# Patient Record
Sex: FEMALE | Employment: UNEMPLOYED | URBAN - METROPOLITAN AREA
[De-identification: names, ages, dates, MRNs, and addresses within clinical notes are randomized per-mention and may not be internally consistent; named-entity substitution may affect disease eponyms.]

---

## 2020-09-13 ENCOUNTER — HOSPITAL ENCOUNTER (EMERGENCY)
Age: 25
Discharge: HOME | End: 2020-09-13

## 2020-09-13 VITALS
OXYGEN SATURATION: 98 % | TEMPERATURE: 100.4 F | WEIGHT: 156 LBS | DIASTOLIC BLOOD PRESSURE: 69 MMHG | RESPIRATION RATE: 15 BRPM | HEART RATE: 108 BPM | SYSTOLIC BLOOD PRESSURE: 136 MMHG

## 2020-09-13 PROCEDURE — 99282 EMERGENCY DEPT VISIT SF MDM: CPT

## 2020-09-13 RX ORDER — AMOXICILLIN AND CLAVULANATE POTASSIUM 875; 125 MG/1; MG/1
1 TABLET, FILM COATED ORAL 2 TIMES DAILY
Qty: 20 TABLET | Refills: 0 | Status: SHIPPED | OUTPATIENT
Start: 2020-09-13 | End: 2020-09-23

## 2020-09-13 RX ORDER — ONDANSETRON 4 MG/1
4 TABLET, ORALLY DISINTEGRATING ORAL EVERY 6 HOURS
Qty: 10 TABLET | Refills: 0 | Status: SHIPPED | OUTPATIENT
Start: 2020-09-13

## 2020-09-13 ASSESSMENT — PAIN SCALES - GENERAL: PAINLEVEL_OUTOF10: 6

## 2020-09-13 ASSESSMENT — PAIN DESCRIPTION - LOCATION: LOCATION: THROAT

## 2020-09-13 NOTE — ED PROVIDER NOTES
EMERGENCY DEPARTMENT ENCOUNTER      PCP: No primary care provider on file. CHIEF COMPLAINT    Chief Complaint   Patient presents with    Emesis    Diarrhea    Fever           This patient was not evaluated by the attending physician. I have independently evaluated this patient. Patient is Paraguayan-speaking only-history, review of systems, plan and disposition today per translation iPad. HPI    José Miguel Wiley is a 30 y.o. female who presents with sore throat, fever, nausea and soft stools. Onset 2-3 days. Context is patient has the above symptoms with no known sick contacts. No known contacts to COVID-19. Patient denies urinary or vaginal symptoms. Denies abdominal discomfort. Denies cough. REVIEW OF SYSTEMS    Constitutional:  Denies fever, chills, weight loss or weakness   HENT: See HPI. Cardiovascular:  Denies chest pain, palpitations   Respiratory:  Denies cough or shortness of breath    GI: See HPI. :  Denies any urinary symptoms or vaginal symptoms. Musculoskeletal:  Denies back pain  Skin:  Denies rash  Neurologic:  Denies headache, focal weakness or sensory changes   Endocrine:  Denies polyuria or polydypsia   Lymphatic:  Denies swollen glands     All other review of systems are negative  See HPI and nursing notes for additional information     PAST MEDICAL AND SURGICAL HISTORY    History reviewed. No pertinent past medical history. History reviewed. No pertinent surgical history.     CURRENT MEDICATIONS    Current Outpatient Rx   Medication Sig Dispense Refill    amoxicillin-clavulanate (AUGMENTIN) 875-125 MG per tablet Take 1 tablet by mouth 2 times daily for 10 days 20 tablet 0    ondansetron (ZOFRAN ODT) 4 MG disintegrating tablet Take 1 tablet by mouth every 6 hours 10 tablet 0       ALLERGIES    No Known Allergies    SOCIAL AND FAMILY HISTORY    Social History     Socioeconomic History    Marital status: Unknown     Spouse name: None    Number of children: None    Years of education: None    Highest education level: None   Occupational History    None   Social Needs    Financial resource strain: None    Food insecurity     Worry: None     Inability: None    Transportation needs     Medical: None     Non-medical: None   Tobacco Use    Smoking status: Never Smoker    Smokeless tobacco: Never Used   Substance and Sexual Activity    Alcohol use: No    Drug use: No    Sexual activity: None   Lifestyle    Physical activity     Days per week: None     Minutes per session: None    Stress: None   Relationships    Social connections     Talks on phone: None     Gets together: None     Attends Yazidism service: None     Active member of club or organization: None     Attends meetings of clubs or organizations: None     Relationship status: None    Intimate partner violence     Fear of current or ex partner: None     Emotionally abused: None     Physically abused: None     Forced sexual activity: None   Other Topics Concern    None   Social History Narrative    None     History reviewed. No pertinent family history. PHYSICAL EXAM    VITAL SIGNS: /78   Pulse 129   Temp 100.4 °F (38 °C) (Oral)   Resp 16   Wt 156 lb (70.8 kg)   SpO2 98%    Constitutional:  Well developed, Well nourished. No distress  HENT:  Normocephalic, Atraumatic, PERRL. EOMI. Sclera clear. Conjunctiva normal, No discharge. Oral pharynx moderately erythematous with 2+ tonsillar hypertrophy on the right. There are multiple white exudates on right tonsil. No anterior pillar fullness or evidence of peritonsillar abscess. Uvula midline. Remaining oral cavity exam without abnormality. Neck/Lymphatics: supple, no JVD,   Several swollen, small, mobile, tender nodes in the right cervical chain. Cardiovascular:    Rate 115, regular. no murmurs/rubs/gallops. No JVD    Respiratory:  Nonlabored breathing. Normal breath sounds, No wheezing  Abdomen:   Bowel sounds normal, Soft, No tenderness, no masses. Musculoskeletal:    Bilateral upper and lower extremity ROM intact without pain or obvious deficit  Integument:  Warm, Dry  Neurologic: Alert & oriented , No focal deficits noted. Cranial nerves II through XII grossly intact. Normal gross motor coordination & motor strength bilateral upper and lower extremities  Sensation intact. Psychiatric:  Affect normal, Mood normal.         ED COURSE & MEDICAL DECISION MAKING       Patient presents today with the above symptoms. Patient's exam reveals exudative pharyngitis on the right. Patient is to 4/4 Centor criteria. I offered laboratory evaluation, rapid strep, urinalysis today. Patient elects to hold and would like treatment for her throat symptoms. I am agreeable to this. Plan for discharge home with oral antibiotic therapy, antiemetics, clear fluid diet for 12-24 hours and advance slowly there after. Of note, patient tachycardic today at approximately 115- patient does have low-grade fever and this is likely the cause. She complains of no respiratory symptoms, chest pain, palpitations otherwise and I do not feel further evaluation, monitoring is warranted at this time given my exam findings today and presence of fever. I recommend follow with PCP over the next several days. Work note provided today. Patient agrees to return emergency department if symptoms worsen or any new symptoms develop. Clinical  IMPRESSION    1. Exudative pharyngitis    2. Nausea vomiting and diarrhea              Comment: Please note this report has been produced using speech recognition software and may contain errors related to that system including errors in grammar, punctuation, and spelling, as well as words and phrases that may be inappropriate. If there are any questions or concerns please feel free to contact the dictating provider for clarification.          Bozena Boydma  09/13/20 [id number]

## 2020-09-13 NOTE — ED TRIAGE NOTES
Pt presents to ED c/o N/V/D for the past couple days. Pt is Slovak speaking, interpretor used at this time. Pt states she has been having a fever, unknown how high, and throat pain. Pt rates pain 6/10. Pt is alert and oriented.

## 2020-09-14 ENCOUNTER — CARE COORDINATION (OUTPATIENT)
Dept: CARE COORDINATION | Age: 25
End: 2020-09-14

## 2020-09-22 ENCOUNTER — CARE COORDINATION (OUTPATIENT)
Dept: CARE COORDINATION | Age: 25
End: 2020-09-22

## 2020-09-29 ENCOUNTER — CARE COORDINATION (OUTPATIENT)
Dept: CARE COORDINATION | Age: 25
End: 2020-09-29

## 2020-09-29 NOTE — CARE COORDINATION
Call to pt for 2 wk ed f/u with  [phone] . Call was abruptly disconnected by pt. No further outreach scheduled with this CTN/ACM. Episode of Care resolved. Patient has this CTN/ACM contact information if future needs arise.

## 2022-02-23 ENCOUNTER — HOSPITAL ENCOUNTER (EMERGENCY)
Age: 27
Discharge: HOME | End: 2022-02-23

## 2022-02-23 VITALS
DIASTOLIC BLOOD PRESSURE: 77 MMHG | OXYGEN SATURATION: 100 % | TEMPERATURE: 98.4 F | HEART RATE: 85 BPM | WEIGHT: 160 LBS | RESPIRATION RATE: 16 BRPM | SYSTOLIC BLOOD PRESSURE: 110 MMHG

## 2022-02-23 DIAGNOSIS — Z32.02 NEGATIVE PREGNANCY TEST: Primary | ICD-10-CM

## 2022-02-23 LAB
INTERPRETATION: NORMAL
PREGNANCY, URINE: NEGATIVE
SPECIFIC GRAVITY, URINE: 1.02 (ref 1–1.03)

## 2022-02-23 PROCEDURE — 81025 URINE PREGNANCY TEST: CPT

## 2022-02-23 PROCEDURE — 99283 EMERGENCY DEPT VISIT LOW MDM: CPT

## 2022-02-23 ASSESSMENT — ENCOUNTER SYMPTOMS
RHINORRHEA: 0
VOMITING: 0
BACK PAIN: 0
NAUSEA: 0
DIARRHEA: 0
COUGH: 0
EYE PAIN: 0
ABDOMINAL PAIN: 0
SHORTNESS OF BREATH: 0

## 2022-02-23 NOTE — ED NOTES
Urine does not state in process at this time. Called lab and spoke with Hermann Vega, who states urine was received but the order needs released from epic. In ed chart, order states it was released at 0838.  Bee able to find order and states will have a result approx 5 mins     [facility]  02/23/22 9707

## 2022-02-23 NOTE — ED TRIAGE NOTES
Patient to the ED wanting a pregnancy test. Reports that she had a period 2 weeks ago, but took a pregnancy test and it was positive. Patient wanting confirmation if she is pregnant or not. Reports thatt she attempted to go to the clinic, but they did not have any appointments.

## 2022-02-23 NOTE — ED PROVIDER NOTES
**ADVANCED PRACTICE PROVIDER, I HAVE EVALUATED THIS PATIENT**        [address] ENCOUNTER      Pt Name: Antonio Cruz  YBS:[id number]  [place] 1995  Date of evaluation: 2/23/2022  Provider: Hakeem Adhikari PA-C      Chief Complaint:    Chief Complaint   Patient presents with    Pregnancy Test         Nursing Notes, Past Medical Hx, Past Surgical Hx, Social Hx, Allergies, and Family Hx were all reviewed and agreed with or any disagreements were addressed in the HPI.    HPI: (Location, Duration, Timing, Severity, Quality, Assoc Sx, Context, Modifying factors)    Chief Complaint of pregnancy concern    This is a  27 y.o. female who presents concerned that she may be pregnant. She mentions previously she was on a planted pregnancy placed 2 years ago, however within the past two weeks had noticed some vaginal bleeding, and had noticed passing some tissues. She mentions that she also took 2 pregnancy tests at home that were both positive. She also felt some fluttering in her abdomen over this time she mentions she attempted to be seen in the clinic but there is no available appointments. She states while on the implant, she has not had regular menstrual periods are, and is not uncommon for her to have one. Today she is denying any abdominal pain, any persisting vaginal bleeding, back pain, lightheadedness, weakness, fevers, vaginal discharge. PastMedical/Surgical History:  History reviewed. No pertinent past medical history. History reviewed. No pertinent surgical history. Medications:  Previous Medications    ONDANSETRON (ZOFRAN ODT) 4 MG DISINTEGRATING TABLET    Take 1 tablet by mouth every 6 hours         Review of Systems:  (2-9 systems needed)  Review of Systems   Constitutional: Negative for chills and fever. HENT: Negative for congestion and rhinorrhea.     Eyes: Negative for pain and visual disturbance. Respiratory: Negative for cough and shortness of breath. Cardiovascular: Negative for chest pain and leg swelling. Gastrointestinal: Negative for abdominal pain, diarrhea, nausea and vomiting. Genitourinary: Positive for vaginal bleeding. Negative for dysuria, hematuria, pelvic pain and vaginal discharge. Musculoskeletal: Negative for back pain and myalgias. Skin: Negative for rash and wound. Neurological: Negative for dizziness and light-headedness. \"Positives and Pertinent negatives as per HPI\"    Physical Exam:  Physical Exam  Vitals and nursing note reviewed. Constitutional:       Appearance: Normal appearance. She is well-developed. She is not ill-appearing or diaphoretic. HENT:      Head: Normocephalic and atraumatic. Right Ear: External ear normal.      Left Ear: External ear normal.      Nose: Nose normal.   Eyes:      General:         Right eye: No discharge. Left eye: No discharge. Pulmonary:      Effort: Pulmonary effort is normal. No respiratory distress. Breath sounds: No stridor. Abdominal:      General: There is no distension. Tenderness: There is no abdominal tenderness. Musculoskeletal:         General: Normal range of motion. Cervical back: Normal range of motion and neck supple. Skin:     General: Skin is warm and dry. Coloration: Skin is not pale. Neurological:      General: No focal deficit present. Mental Status: She is alert and oriented to person, place, and time.    Psychiatric:         Mood and Affect: Mood normal.         Behavior: Behavior normal.         MEDICAL DECISION MAKING    Vitals:    Vitals:    02/23/22 0826 02/23/22 0829   BP:  110/77   Pulse:  85   Resp:  16   Temp:  98.4 °F (36.9 °C)   TempSrc:  Oral   SpO2:  100%   Weight: 160 lb (72.6 kg)        LABS:  Labs Reviewed   PREGNANCY, URINE        Remainder of labs reviewed and were negative at this time or not returned at the time of this note.    RADIOLOGY:   Non-plain film images such as CT, Ultrasound and MRI are read by the radiologist. Brittany Landon PA-C have directly visualized the radiologic plain film image(s) with the below findings:      Interpretation per the Radiologist below, if available at the time of this note:    No orders to display        No results found. MEDICAL DECISION MAKING / ED COURSE:      PROCEDURES:   Procedures    None    Patient was given:  Medications - No data to display    64-year-old female presents with above HPI. Currently no abdominal pain, vaginal bleeding, vaginal discharge. She is afebrile here as well as at home. Her vitals are within normal limits as well. Her urine pregnancy test today is negative. Her vitals are stable and she is otherwise denying any abdominal pain vaginal bleeding , vaginal discharge. At this time no concern for hemorrhage, septic . My suspicion for ectopic pregnancy is low. Differentials would include dysmenorrhea, abnormal uterine bleeding, spontaneous miscarriage, early pregnancy. At this point I do feel she safe for outpatient management, recommendations to follow-up with her PCP, OB/GYN, return with any worsening. She verbalized understanding is agreeable this plan. The patient tolerated their visit well. I evaluated the patient. The physician was available for consultation as needed. The patient and / or the family were informed of the results of any tests, a time was given to answer questions, a plan was proposed and they agreed with plan. CLINICAL IMPRESSION:  1.  Negative pregnancy test        DISPOSITION        PATIENT REFERRED TO:  [facility] - ADULT  [address] [facility] [address]  [facility], [address] [address] [address] [address]  [address] [facility] [place]  [address] [facility] [address] [id number]  [address] [place] [address] MEDICATIONS:  New Prescriptions    No medications on file       DISCONTINUED MEDICATIONS:  Discontinued Medications    No medications on file              (Please note the MDM and HPI sections of this note were completed with a voice recognition program.  Efforts were made to edit the dictations but occasionally words are mis-transcribed.)    Electronically signed, Dana Kulkarni PA-C,           Dana Kulkarni PA-C  02/23/22 2006

## 2022-06-10 LAB
ABO, EXTERNAL RESULT: NORMAL
C. TRACHOMATIS, EXTERNAL RESULT: NOT DETECTED
HEP B, EXTERNAL RESULT: NEGATIVE
HIV, EXTERNAL RESULT: NONREACTIVE
N. GONORRHOEAE, EXTERNAL RESULT: NOT DETECTED
RH FACTOR, EXTERNAL RESULT: NORMAL
RPR, EXTERNAL RESULT: NONREACTIVE
RUBELLA TITER, EXTERNAL RESULT: NORMAL

## 2022-12-08 LAB — GBS, EXTERNAL RESULT: NEGATIVE

## 2022-12-12 ENCOUNTER — HOSPITAL ENCOUNTER (OUTPATIENT)
Age: 27
Discharge: HOME | End: 2022-12-12
Attending: OBSTETRICS & GYNECOLOGY | Admitting: OBSTETRICS & GYNECOLOGY

## 2022-12-12 VITALS
OXYGEN SATURATION: 99 % | SYSTOLIC BLOOD PRESSURE: 112 MMHG | DIASTOLIC BLOOD PRESSURE: 73 MMHG | TEMPERATURE: 97.5 F | HEART RATE: 75 BPM | RESPIRATION RATE: 18 BRPM

## 2022-12-12 PROCEDURE — 99213 OFFICE O/P EST LOW 20 MIN: CPT

## 2022-12-12 PROCEDURE — 99213 OFFICE O/P EST LOW 20 MIN: CPT | Performed by: ADVANCED PRACTICE MIDWIFE

## 2022-12-12 RX ORDER — ONDANSETRON 4 MG/1
4 TABLET, ORALLY DISINTEGRATING ORAL EVERY 8 HOURS PRN
Status: DISCONTINUED | OUTPATIENT
Start: 2022-12-12 | End: 2022-12-12 | Stop reason: HOSPADM

## 2022-12-12 RX ORDER — ONDANSETRON 2 MG/ML
4 INJECTION INTRAMUSCULAR; INTRAVENOUS EVERY 6 HOURS PRN
Status: DISCONTINUED | OUTPATIENT
Start: 2022-12-12 | End: 2022-12-12 | Stop reason: HOSPADM

## 2022-12-12 RX ORDER — ACETAMINOPHEN 325 MG/1
650 TABLET ORAL EVERY 4 HOURS PRN
Status: DISCONTINUED | OUTPATIENT
Start: 2022-12-12 | End: 2022-12-12 | Stop reason: HOSPADM

## 2022-12-12 ASSESSMENT — PAIN DESCRIPTION - DESCRIPTORS: DESCRIPTORS: ACHING;CRAMPING

## 2022-12-12 NOTE — PROGRESS NOTES
Department of Obstetrics and Gynecology  Labor and Delivery  TRIAGE NOTE      SUBJECTIVE:  Contractions in pregnancy  Pt reports to triage for consistent contractions since this AM. Pt denies an increase in pain since ctx started. Pt had SVE in office last Thursday. +FM. Pt denies VB, LOF. Pt denies dysuria. OBJECTIVE    Vitals:  /82   Pulse 100   Temp 97.5 °F (36.4 °C) (Axillary)   Resp 18   LMP 02/09/2022   SpO2 99%       CONSTITUTIONAL:  negative  RESPIRATORY:  negative  CARDIOVASCULAR:  negative  GASTROINTESTINAL:  negative  ALLERGIC/IMMUNOLOGIC:  negative  NEUROLOGICAL:  negative  BEHAVIOR/PSYCH:  negative    Cervix:             Dilation:     2 cm         Effacement:    70%         Station:     -3 cm         Consistency:    medium         Position:     posterior    Fetal Position:    Cephalic    Membranes:    Intact    Fetal heart rate:        Baseline FHR :    150 bpm              Fetal Accelerations:  present        Fetal Decelerations:  absent        Fetal Variability:   moderate    Contraction frequency: 2-3 minutes     DATA:  Lab Results   Component Value Date    WBC 6.1 08/28/2018    HGB 12.8 08/28/2018    HCT 38.0 08/28/2018    MCV 84.3 08/28/2018     (L) 08/28/2018         ASSESSMENT:   Contractions in pregnancy        PLAN: SVE unchanged since Thursday. Pt to be rechecked in 1 hour to assess for labor.          DELON Mendez CNM

## 2022-12-12 NOTE — FLOWSHEET NOTE
Pt presents to Labor & Delivery ambulatory with a steady gait. Pt here for \"Contractions about 10-15mins apart\". Pt denies any vaginal bleeding, leaking of fluid, abdominal pain or tenderness. Pt states feeling fetal movement. Pt oriented to room. Call light in within reach, bed in lowest position, with wheels locked, side rails up.  Portia Chino #[id number] used throughout admission.

## 2022-12-12 NOTE — FLOWSHEET NOTE
Security on unit with pt's daughter. Security found daughter alone in parking lot. Security at bedside at this time, questioning pt at this time.

## 2022-12-12 NOTE — DISCHARGE INSTRUCTIONS
OB DISCHARGE INSTRUCTIONS    Discharge Disposition:Home, May take Tylenol for pain.  Labor  Regular tightening of the uterus coming as often as once every 15 minutes. Menstrual-like cramps, they may be regular, or may be continuous and move to your back. A low, dull backache different from what you normally experience. It may come and go. Vaginal leaking of fluid. Any bleeding from your vagina. Pain, burning or bleeding when you urinate. Labor  Call your doctor; or come to the hospital, if you have:  Contractions coming about every 3-5 minutes, for about one hour. Labor contractions are usually strong enough that you cannot walk or talk while having contractions. (Contractions can feel like menstrual cramps, tightening in your abdomen, rectal pressure or a backache. This feeling comes and goes.)   Vaginal leaking of fluid. Heavy, bright red bleeding, like the heavy days of your period.  (A little bloody show is normal in labor.)     Always call your Doctor if you:  Notice decreased movement of your baby, different from what you are used to. Have heavy, bright red bleeding, like the heavy days of your period. Have vaginal leaking of watery fluid. Your next appointment:Keep your next appointment and return to L & D if needed. Activity:  AS TOLERATED  Diet:  REGULAR  If you have any questions regarding your discharge instructions call us at [phone]\         Counting Your Baby's Kicks: Care Instructions  Overview     Counting your baby's kicks is one way your doctor can tell that your baby is healthy. Most women--especially in a first pregnancy--feel their baby move for the first time between 16 and 22 weeks. The movement may feel like flutters rather than kicks. Your baby may move more at certain times of the day. When you are active, you may notice less kicking than when you are resting. At your prenatal visits, your doctor will ask whether the baby is active.   In your last trimester, your doctor may ask you to count the number of times you feel your baby move. Follow-up care is a key part of your treatment and safety. Be sure to make and go to all appointments, and call your doctor if you are having problems. It's also a good idea to know your test results and keep a list of the medicines you take. How do you count fetal kicks? A common method of checking your baby's movement is to note the length of time it takes to count ten movements (such as kicks, flutters, or rolls). Pick your baby's most active time of day to count. This may be any time from morning to evening. If you don't feel 10 movements in an hour, have something to eat or drink and count for another hour. If you don't feel at least 10 movements in the 2-hour period, call your doctor. When should you call for help? Call your doctor now or seek immediate medical care if:    You noticed that your baby has stopped moving or is moving much less than normal.   Watch closely for changes in your health, and be sure to contact your doctor if you have any problems. Where can you learn more? Go to http://www.woods.com/ and enter U048 to learn more about \"Counting Your Baby's Kicks: Care Instructions. \"  Current as of: February 23, 2022               Content Version: 13.5  © 8222-0897 [employer]wise, Incorporated. Care instructions adapted under license by [facility] ([facility]). If you have questions about a medical condition or this instruction, always ask your healthcare professional. [place] [address] any warranty or liability for your use of this information. Gini Epperson

## 2022-12-13 NOTE — FLOWSHEET NOTE
EFM and [place] applied to abdomen. Abdomen soft on palpation and pt denies tenderness to the touch.

## 2022-12-13 NOTE — FLOWSHEET NOTE
All discharge instructions reviewed with patient. Labor precautions discussed. All questions answered. Pt verbalized understanding. Pt off unit ambulatory with no signs of distress. RN also discusses need to keep pt's daughter in a safe environment. , Addie Tompkins #[id number] used throughout pt visit.

## 2022-12-23 ENCOUNTER — HOSPITAL ENCOUNTER (INPATIENT)
Age: 27
LOS: 2 days | Discharge: HOME | End: 2022-12-25
Attending: OBSTETRICS & GYNECOLOGY | Admitting: OBSTETRICS & GYNECOLOGY
Payer: MEDICAID

## 2022-12-23 LAB
ABO/RH: NORMAL
AMPHETAMINES: NEGATIVE
ANTIBODY SCREEN: NEGATIVE
BARBITURATE SCREEN URINE: NEGATIVE
BASOPHILS ABSOLUTE: 0 K/CU MM
BASOPHILS RELATIVE PERCENT: 0.6 % (ref 0–1)
BENZODIAZEPINE SCREEN, URINE: NEGATIVE
CANNABINOID SCREEN URINE: NEGATIVE
COCAINE METABOLITE: NEGATIVE
DIFFERENTIAL TYPE: ABNORMAL
EOSINOPHILS ABSOLUTE: 0.1 K/CU MM
EOSINOPHILS RELATIVE PERCENT: 2.2 % (ref 0–3)
HCT VFR BLD CALC: 39.3 % (ref 37–47)
HEMOGLOBIN: 13 GM/DL (ref 12.5–16)
IMMATURE NEUTROPHIL %: 0.3 % (ref 0–0.43)
LYMPHOCYTES ABSOLUTE: 2.2 K/CU MM
LYMPHOCYTES RELATIVE PERCENT: 35.6 % (ref 24–44)
MCH RBC QN AUTO: 27.5 PG (ref 27–31)
MCHC RBC AUTO-ENTMCNC: 33.1 % (ref 32–36)
MCV RBC AUTO: 83.3 FL (ref 78–100)
MONOCYTES ABSOLUTE: 0.6 K/CU MM
MONOCYTES RELATIVE PERCENT: 9.5 % (ref 0–4)
NUCLEATED RBC %: 0 %
OPIATES, URINE: NEGATIVE
OXYCODONE: NEGATIVE
PDW BLD-RTO: 13.8 % (ref 11.7–14.9)
PHENCYCLIDINE, URINE: NEGATIVE
PLATELET # BLD: 130 K/CU MM (ref 140–440)
RBC # BLD: 4.72 M/CU MM (ref 4.2–5.4)
SEGMENTED NEUTROPHILS ABSOLUTE COUNT: 3.3 K/CU MM
SEGMENTED NEUTROPHILS RELATIVE PERCENT: 51.8 % (ref 36–66)
TOTAL IMMATURE NEUTOROPHIL: 0.02 K/CU MM
TOTAL NUCLEATED RBC: 0 K/CU MM
WBC # BLD: 6.3 K/CU MM (ref 4–10.5)

## 2022-12-23 PROCEDURE — 1220000000 HC SEMI PRIVATE OB R&B

## 2022-12-23 PROCEDURE — 2580000003 HC RX 258: Performed by: ADVANCED PRACTICE MIDWIFE

## 2022-12-23 PROCEDURE — 80307 DRUG TEST PRSMV CHEM ANLYZR: CPT

## 2022-12-23 PROCEDURE — 59409 OBSTETRICAL CARE: CPT | Performed by: ADVANCED PRACTICE MIDWIFE

## 2022-12-23 PROCEDURE — 85025 COMPLETE CBC W/AUTO DIFF WBC: CPT

## 2022-12-23 PROCEDURE — 10907ZC DRAINAGE OF AMNIOTIC FLUID, THERAPEUTIC FROM PRODUCTS OF CONCEPTION, VIA NATURAL OR ARTIFICIAL OPENING: ICD-10-PCS | Performed by: ADVANCED PRACTICE MIDWIFE

## 2022-12-23 PROCEDURE — 6370000000 HC RX 637 (ALT 250 FOR IP): Performed by: ADVANCED PRACTICE MIDWIFE

## 2022-12-23 PROCEDURE — 86901 BLOOD TYPING SEROLOGIC RH(D): CPT

## 2022-12-23 PROCEDURE — 86850 RBC ANTIBODY SCREEN: CPT

## 2022-12-23 PROCEDURE — 86900 BLOOD TYPING SEROLOGIC ABO: CPT

## 2022-12-23 PROCEDURE — 3E033VJ INTRODUCTION OF OTHER HORMONE INTO PERIPHERAL VEIN, PERCUTANEOUS APPROACH: ICD-10-PCS | Performed by: ADVANCED PRACTICE MIDWIFE

## 2022-12-23 PROCEDURE — 6360000002 HC RX W HCPCS: Performed by: ADVANCED PRACTICE MIDWIFE

## 2022-12-23 PROCEDURE — 7200000001 HC VAGINAL DELIVERY

## 2022-12-23 RX ORDER — FENTANYL CITRATE 50 UG/ML
100 INJECTION, SOLUTION INTRAMUSCULAR; INTRAVENOUS
Status: DISCONTINUED | OUTPATIENT
Start: 2022-12-23 | End: 2022-12-25 | Stop reason: HOSPADM

## 2022-12-23 RX ORDER — IBUPROFEN 600 MG/1
600 TABLET ORAL EVERY 6 HOURS PRN
Status: DISCONTINUED | OUTPATIENT
Start: 2022-12-23 | End: 2022-12-25 | Stop reason: HOSPADM

## 2022-12-23 RX ORDER — SODIUM CHLORIDE, SODIUM LACTATE, POTASSIUM CHLORIDE, CALCIUM CHLORIDE 600; 310; 30; 20 MG/100ML; MG/100ML; MG/100ML; MG/100ML
INJECTION, SOLUTION INTRAVENOUS CONTINUOUS
Status: DISCONTINUED | OUTPATIENT
Start: 2022-12-23 | End: 2022-12-25 | Stop reason: HOSPADM

## 2022-12-23 RX ORDER — DOCUSATE SODIUM 100 MG/1
100 CAPSULE, LIQUID FILLED ORAL 2 TIMES DAILY
Status: DISCONTINUED | OUTPATIENT
Start: 2022-12-23 | End: 2022-12-23 | Stop reason: SDUPTHER

## 2022-12-23 RX ORDER — LIDOCAINE HYDROCHLORIDE 10 MG/ML
30 INJECTION, SOLUTION EPIDURAL; INFILTRATION; INTRACAUDAL; PERINEURAL PRN
Status: DISCONTINUED | OUTPATIENT
Start: 2022-12-23 | End: 2022-12-23 | Stop reason: HOSPADM

## 2022-12-23 RX ORDER — SODIUM CHLORIDE 9 MG/ML
25 INJECTION, SOLUTION INTRAVENOUS PRN
Status: DISCONTINUED | OUTPATIENT
Start: 2022-12-23 | End: 2022-12-25 | Stop reason: HOSPADM

## 2022-12-23 RX ORDER — SODIUM CHLORIDE 0.9 % (FLUSH) 0.9 %
5-40 SYRINGE (ML) INJECTION EVERY 12 HOURS SCHEDULED
Status: DISCONTINUED | OUTPATIENT
Start: 2022-12-23 | End: 2022-12-25 | Stop reason: HOSPADM

## 2022-12-23 RX ORDER — SODIUM CHLORIDE 0.9 % (FLUSH) 0.9 %
5-40 SYRINGE (ML) INJECTION PRN
Status: DISCONTINUED | OUTPATIENT
Start: 2022-12-23 | End: 2022-12-25 | Stop reason: HOSPADM

## 2022-12-23 RX ORDER — TRANEXAMIC ACID 10 MG/ML
1000 INJECTION, SOLUTION INTRAVENOUS
Status: ACTIVE | OUTPATIENT
Start: 2022-12-23 | End: 2022-12-24

## 2022-12-23 RX ORDER — SODIUM CHLORIDE, SODIUM LACTATE, POTASSIUM CHLORIDE, CALCIUM CHLORIDE 600; 310; 30; 20 MG/100ML; MG/100ML; MG/100ML; MG/100ML
INJECTION, SOLUTION INTRAVENOUS CONTINUOUS
Status: DISCONTINUED | OUTPATIENT
Start: 2022-12-23 | End: 2022-12-23 | Stop reason: SDUPTHER

## 2022-12-23 RX ORDER — SODIUM CHLORIDE 0.9 % (FLUSH) 0.9 %
5-40 SYRINGE (ML) INJECTION EVERY 12 HOURS SCHEDULED
Status: DISCONTINUED | OUTPATIENT
Start: 2022-12-23 | End: 2022-12-23 | Stop reason: SDUPTHER

## 2022-12-23 RX ORDER — SODIUM CHLORIDE 9 MG/ML
INJECTION, SOLUTION INTRAVENOUS PRN
Status: DISCONTINUED | OUTPATIENT
Start: 2022-12-23 | End: 2022-12-25 | Stop reason: HOSPADM

## 2022-12-23 RX ORDER — CARBOPROST TROMETHAMINE 250 UG/ML
250 INJECTION, SOLUTION INTRAMUSCULAR PRN
Status: DISCONTINUED | OUTPATIENT
Start: 2022-12-23 | End: 2022-12-25 | Stop reason: HOSPADM

## 2022-12-23 RX ORDER — ONDANSETRON 2 MG/ML
4 INJECTION INTRAMUSCULAR; INTRAVENOUS EVERY 6 HOURS PRN
Status: DISCONTINUED | OUTPATIENT
Start: 2022-12-23 | End: 2022-12-25 | Stop reason: HOSPADM

## 2022-12-23 RX ORDER — MISOPROSTOL 200 UG/1
800 TABLET ORAL PRN
Status: DISCONTINUED | OUTPATIENT
Start: 2022-12-23 | End: 2022-12-25 | Stop reason: HOSPADM

## 2022-12-23 RX ORDER — SODIUM CHLORIDE, SODIUM LACTATE, POTASSIUM CHLORIDE, AND CALCIUM CHLORIDE .6; .31; .03; .02 G/100ML; G/100ML; G/100ML; G/100ML
500 INJECTION, SOLUTION INTRAVENOUS PRN
Status: DISCONTINUED | OUTPATIENT
Start: 2022-12-23 | End: 2022-12-25 | Stop reason: HOSPADM

## 2022-12-23 RX ORDER — ACETAMINOPHEN 500 MG
1000 TABLET ORAL EVERY 8 HOURS PRN
Status: DISCONTINUED | OUTPATIENT
Start: 2022-12-23 | End: 2022-12-25 | Stop reason: HOSPADM

## 2022-12-23 RX ORDER — DOCUSATE SODIUM 100 MG/1
100 CAPSULE, LIQUID FILLED ORAL 2 TIMES DAILY
Status: DISCONTINUED | OUTPATIENT
Start: 2022-12-23 | End: 2022-12-25 | Stop reason: HOSPADM

## 2022-12-23 RX ORDER — SODIUM CHLORIDE 0.9 % (FLUSH) 0.9 %
5-40 SYRINGE (ML) INJECTION PRN
Status: DISCONTINUED | OUTPATIENT
Start: 2022-12-23 | End: 2022-12-23 | Stop reason: SDUPTHER

## 2022-12-23 RX ORDER — METHYLERGONOVINE MALEATE 0.2 MG/ML
200 INJECTION INTRAVENOUS PRN
Status: DISCONTINUED | OUTPATIENT
Start: 2022-12-23 | End: 2022-12-25 | Stop reason: HOSPADM

## 2022-12-23 RX ORDER — LANOLIN 100 %
OINTMENT (GRAM) TOPICAL PRN
Status: DISCONTINUED | OUTPATIENT
Start: 2022-12-23 | End: 2022-12-25 | Stop reason: HOSPADM

## 2022-12-23 RX ORDER — SODIUM CHLORIDE, SODIUM LACTATE, POTASSIUM CHLORIDE, AND CALCIUM CHLORIDE .6; .31; .03; .02 G/100ML; G/100ML; G/100ML; G/100ML
1000 INJECTION, SOLUTION INTRAVENOUS PRN
Status: DISCONTINUED | OUTPATIENT
Start: 2022-12-23 | End: 2022-12-25 | Stop reason: HOSPADM

## 2022-12-23 RX ADMIN — SODIUM CHLORIDE, POTASSIUM CHLORIDE, SODIUM LACTATE AND CALCIUM CHLORIDE: 600; 310; 30; 20 INJECTION, SOLUTION INTRAVENOUS at 09:01

## 2022-12-23 RX ADMIN — Medication 166.7 ML: at 12:42

## 2022-12-23 RX ADMIN — Medication 87.3 MILLI-UNITS/MIN: at 12:53

## 2022-12-23 RX ADMIN — DOCUSATE SODIUM 100 MG: 100 CAPSULE, LIQUID FILLED ORAL at 20:18

## 2022-12-23 RX ADMIN — IBUPROFEN 600 MG: 600 TABLET ORAL at 14:17

## 2022-12-23 RX ADMIN — ACETAMINOPHEN 1000 MG: 500 TABLET ORAL at 16:40

## 2022-12-23 ASSESSMENT — PAIN DESCRIPTION - LOCATION: LOCATION: ABDOMEN

## 2022-12-23 ASSESSMENT — PAIN DESCRIPTION - DESCRIPTORS: DESCRIPTORS: DISCOMFORT;CRAMPING

## 2022-12-23 ASSESSMENT — PAIN - FUNCTIONAL ASSESSMENT: PAIN_FUNCTIONAL_ASSESSMENT: ACTIVITIES ARE NOT PREVENTED

## 2022-12-23 ASSESSMENT — PAIN SCALES - GENERAL
PAINLEVEL_OUTOF10: 1
PAINLEVEL_OUTOF10: 3

## 2022-12-23 ASSESSMENT — PAIN DESCRIPTION - ORIENTATION: ORIENTATION: MID;LOWER

## 2022-12-23 NOTE — H&P
Department of Obstetrics and Gynecology   Obstetrics History and Physical        CHIEF COMPLAINT: IOL      HISTORY OF PRESENT ILLNESS:      The patient is a 32 y.o. female at 37w11d. OB History          4    Para   2    Term   2            AB        Living   2         SAB        IAB        Ectopic        Molar        Multiple        Live Births   2            Patient presents with a chief complaint as above and is being admitted for induction    Estimated Due Date: Estimated Date of Delivery: 22    PRENATAL CARE:    Complicated by: none    PAST OB HISTORY  OB History          4    Para   2    Term   2            AB        Living   2         SAB        IAB        Ectopic        Molar        Multiple        Live Births   2                Past Medical History:    No past medical history on file. Past Surgical History:    No past surgical history on file. Allergies:  Patient has no known allergies. Social History:    Social History     Socioeconomic History    Marital status: Unknown     Spouse name: Not on file    Number of children: Not on file    Years of education: Not on file    Highest education level: Not on file   Occupational History    Not on file   Tobacco Use    Smoking status: Never    Smokeless tobacco: Never   Vaping Use    Vaping Use: Never used   Substance and Sexual Activity    Alcohol use: No    Drug use: No    Sexual activity: Not on file   Other Topics Concern    Not on file   Social History Narrative    Not on file     Social Determinants of Health     Financial Resource Strain: Not on file   Food Insecurity: Not on file   Transportation Needs: Not on file   Physical Activity: Not on file   Stress: Not on file   Social Connections: Not on file   Intimate Partner Violence: Not on file   Housing Stability: Not on file     Family History:   No family history on file.   Medications Prior to Admission:  Medications Prior to Admission: ondansetron (ZOFRAN ODT) 4 MG disintegrating tablet, Take 1 tablet by mouth every 6 hours (Patient not taking: Reported on 12/12/2022)    REVIEW OF SYSTEMS:    CONSTITUTIONAL:  negative  RESPIRATORY:  negative  CARDIOVASCULAR:  negative  GASTROINTESTINAL:  negative  ALLERGIC/IMMUNOLOGIC:  negative  GI/: negative  NEUROLOGICAL:  negative  BEHAVIOR/PSYCH:  negative    PHYSICAL EXAM:  Blood pressure 102/67, pulse 85, temperature 98.3 °F (36.8 °C), temperature source Oral, resp. rate 18, last menstrual period 02/09/2022, SpO2 98 %, unknown if currently breastfeeding. Lab Results   Component Value Date    WBC 6.1 08/28/2018    HGB 12.8 08/28/2018    HCT 38.0 08/28/2018    MCV 84.3 08/28/2018     (L) 08/28/2018       Blood Type   GBS-  Rubella-   HIV-  Hep B-         General appearance:  awake, alert, cooperative, no apparent distress, and appears stated age  Neurologic:  Awake, alert, oriented to name, place and time. Lungs:  CTAB, no SOB and dyspnea   Abdomen:  Soft, non tender, gravid  Fetal heart rate:           Baseline:  135        Accelerations:  present       Long Term Variability:  moderate       Decelerations:  absent       Pelvis:  Adequate pelvis  Cervix: 3 cm 75% soft -2      Contraction frequency:  4 minutes    Membranes:  Intact    ASSESSMENT:    32 y.o. female at 37w11d, Estimated Date of Delivery: 12/24/22  GBS:negative  FHR Cat: 1    PLAN:  Admit, anticipate normal delivery, routine labor orders  Other: AROM      I have collaborated and updated Dr. Nina Falk  and the MD agrees with the current POC.       DELON Gorman CNM

## 2022-12-23 NOTE — FLOWSHEET NOTE
EFM and TOCO applied. . Patient reports normal fetal movement that can be palpated by this nurse. Patient denies vaginal bleeding or loss of fluids. Patient reports intermittent back pain but denies abdominal pain.

## 2022-12-23 NOTE — PROGRESS NOTES
Department of Obstetrics and Gynecology  Labor and Delivery   Midwifery Progress Note      SUBJECTIVE:  AROM    OBJECTIVE:      Fetal heart rate:       Baseline Heart Rate:  150        Accelerations:  present       Variability:  moderate       Decelerations:  absent         Contraction frequency: 4 minutes    Membranes:  Ruptured clear fluid    Cervix:         Dilation:  4 cm         Effacement:  75%         Station:  -2         Position:  mid             ASSESSMENT     AROM for clear fluid    PLAN:    Continue routine orders.

## 2022-12-23 NOTE — FLOWSHEET NOTE
Patient arrives to the birthing center ambulatory for her scheduled induction of labor. Patient escorted to LD05, instructed to change into gown, and provide urine specimen. Patient oriented to room and call light.

## 2022-12-24 PROCEDURE — 1220000000 HC SEMI PRIVATE OB R&B

## 2022-12-24 PROCEDURE — 6370000000 HC RX 637 (ALT 250 FOR IP): Performed by: ADVANCED PRACTICE MIDWIFE

## 2022-12-24 PROCEDURE — 2580000003 HC RX 258: Performed by: ADVANCED PRACTICE MIDWIFE

## 2022-12-24 RX ADMIN — SODIUM CHLORIDE, PRESERVATIVE FREE 10 ML: 5 INJECTION INTRAVENOUS at 09:16

## 2022-12-24 RX ADMIN — ACETAMINOPHEN 1000 MG: 500 TABLET ORAL at 15:12

## 2022-12-24 RX ADMIN — IBUPROFEN 600 MG: 600 TABLET ORAL at 09:16

## 2022-12-24 RX ADMIN — DOCUSATE SODIUM 100 MG: 100 CAPSULE, LIQUID FILLED ORAL at 09:16

## 2022-12-24 RX ADMIN — DOCUSATE SODIUM 100 MG: 100 CAPSULE, LIQUID FILLED ORAL at 21:10

## 2022-12-24 RX ADMIN — IBUPROFEN 600 MG: 600 TABLET ORAL at 17:06

## 2022-12-24 ASSESSMENT — PAIN SCALES - GENERAL
PAINLEVEL_OUTOF10: 2
PAINLEVEL_OUTOF10: 3
PAINLEVEL_OUTOF10: 3

## 2022-12-24 NOTE — PLAN OF CARE
Problem: Vaginal Birth or  Section  Goal: Fetal and maternal status remain reassuring during the birth process  Description:  Birth OB-Pregnancy care plan goal which identifies if the fetal and maternal status remain reassuring during the birth process  2022 by Don Severino RN  Outcome: Progressing  2022 by Tiffanie Hutchinson RN  Outcome: Progressing     Problem: Postpartum  Goal: Experiences normal postpartum course  Description:  Postpartum OB-Pregnancy care plan goal which identifies if the mother is experiencing a normal postpartum course  2022 by Don Severino RN  Outcome: Progressing  2022 by Tiffanie Hutchinson RN  Outcome: Progressing  Goal: Appropriate maternal -  bonding  Description:  Postpartum OB-Pregnancy care plan goal which identifies if the mother and  are bonding appropriately  2022 by Don Severino RN  Outcome: Progressing  2022 by Tiffanie Hutchinson RN  Outcome: Progressing  Goal: Establishment of infant feeding pattern  Description:  Postpartum OB-Pregnancy care plan goal which identifies if the mother is establishing a feeding pattern with their   2022 by Don Severino RN  Outcome: Progressing  2022 by Tiffanie Hutchinson RN  Outcome: Progressing  Goal: Incisions, wounds, or drain sites healing without S/S of infection  2022 by Don Severino RN  Outcome: Progressing  2022 by Tiffanie Hutchinson RN  Outcome: Progressing  Flowsheets (Taken 2022)  Incisions, Wounds, or Drain Sites Healing Without Sign and Symptoms of Infection: ADMISSION and DAILY: Assess and document risk factors for pressure ulcer development     Problem: Pain  Goal: Verbalizes/displays adequate comfort level or baseline comfort level  2022 by Don Severino RN  Outcome: Progressing  Flowsheets (Taken 2022)  Verbalizes/displays adequate comfort level or baseline comfort level:   Encourage patient to monitor pain and request assistance   Assess pain using appropriate pain scale   Administer analgesics based on type and severity of pain and evaluate response   Implement non-pharmacological measures as appropriate and evaluate response   Consider cultural and social influences on pain and pain management   Notify Licensed Independent Practitioner if interventions unsuccessful or patient reports new pain  12/23/2022 2203 by Chrissy Anguiano RN  Outcome: Progressing  Flowsheets  Taken 12/23/2022 2014 by Chrissy Anguiano RN  Verbalizes/displays adequate comfort level or baseline comfort level:   Encourage patient to monitor pain and request assistance   Assess pain using appropriate pain scale   Administer analgesics based on type and severity of pain and evaluate response   Implement non-pharmacological measures as appropriate and evaluate response   Consider cultural and social influences on pain and pain management   Notify Licensed Independent Practitioner if interventions unsuccessful or patient reports new pain  Taken 12/23/2022 1617 by Alvin Agustin RN  Verbalizes/displays adequate comfort level or baseline comfort level: Encourage patient to monitor pain and request assistance     Problem: Infection - Adult  Goal: Absence of infection at discharge  12/24/2022 0901 by She Delgado.  Delbert Krabbe, RN  Outcome: Progressing  Flowsheets (Taken 12/24/2022 9572)  Absence of infection at discharge: Assess and monitor for signs and symptoms of infection  12/23/2022 2203 by Chrissy Anguiano RN  Outcome: Progressing  Flowsheets (Taken 12/23/2022 2014)  Absence of infection at discharge:   Assess and monitor for signs and symptoms of infection   Monitor lab/diagnostic results   Monitor all insertion sites i.e., indwelling lines, tubes and drains   Monitor endotracheal (as able) and nasal secretions for changes in amount and color   [place] appropriate cooling/warming therapies per order   Administer medications as ordered   Identify and instruct in appropriate isolation precautions for identified infection/condition   Instruct and encourage patient and family to use good hand hygiene technique  Goal: Absence of infection during hospitalization  12/24/2022 0901 by Kalpesh Wong. Erick Moura RN  Outcome: Progressing  Flowsheets (Taken 12/24/2022 9070)  Absence of infection during hospitalization: Assess and monitor for signs and symptoms of infection  12/23/2022 2203 by Ely Fried RN  Outcome: Progressing  Flowsheets (Taken 12/23/2022 2014)  Absence of infection during hospitalization:   Assess and monitor for signs and symptoms of infection   Monitor lab/diagnostic results   Monitor all insertion sites i.e., indwelling lines, tubes and drains   Monitor endotracheal (as able) and nasal secretions for changes in amount and color   [place] appropriate cooling/warming therapies per order   Administer medications as ordered   Instruct and encourage patient and family to use good hand hygiene technique   Identify and instruct in appropriate isolation precautions for identified infection/condition  Goal: Absence of fever/infection during anticipated neutropenic period  12/24/2022 0901 by Kalpesh Wong. Erick Moura RN  Outcome: Progressing  12/23/2022 2203 by Ely Fried RN  Outcome: Progressing  Flowsheets (Taken 12/23/2022 2014)  Absence of fever/infection during anticipated neutropenic period:   Monitor white blood cell count   Administer growth factors as ordered   Implement neutropenic guidelines     Problem: Safety - Adult  Goal: Free from fall injury  12/24/2022 0901 by Kalpesh Wong.  Erick Moura RN  Outcome: Progressing  Flowsheets (Taken 12/24/2022 0810)  Free From Fall Injury: Instruct family/caregiver on patient safety  12/23/2022 2203 by Ely Fried RN  Outcome: Ok Fernando (Taken 12/23/2022 2014)  Free From Fall Injury: Shilpi Santo family/caregiver on patient safety     Problem: Discharge Planning  Goal: Discharge to home or other facility with appropriate resources  12/24/2022 0901 by Paradise Cid. Jacqueline Vela RN  Outcome: Progressing  12/23/2022 2203 by Fred Christianson RN  Outcome: Progressing     Problem: Chronic Conditions and Co-morbidities  Goal: Patient's chronic conditions and co-morbidity symptoms are monitored and maintained or improved  12/24/2022 0901 by Paradise Cid.  Jacqueline Vela RN  Outcome: Progressing  12/23/2022 2203 by Fred Christianson RN  Outcome: Progressing

## 2022-12-24 NOTE — PROGRESS NOTES
Pt awake and resting in bed. Informed pt if she needs nurse to call number listed on white board. Pt verbalizes understanding. Assessment completed. Language line used.

## 2022-12-24 NOTE — PROGRESS NOTES
Subjective:     Postpartum Day 1: Vaginal delivery    The patient feels well. The patient denies emotional concerns. Pain is well controlled with current medications. The baby is well. Objective:      Vitals:    12/24/22 0339   BP: (!) 142/83   Pulse: 61   Resp: 16   Temp: 98.3 °F (36.8 °C)   SpO2: 98%       General:    alert, appears stated age, and cooperative       Lochia:  appropriate   Uterine Fundus:   firm       DVT Evaluation:  No evidence of DVT seen on physical exam.     Assessment:     1. Post partum day 1 . Doing well. Plan:     Continue current care.

## 2022-12-24 NOTE — PLAN OF CARE
Problem: Vaginal Birth or  Section  Goal: Fetal and maternal status remain reassuring during the birth process  Description:  Birth OB-Pregnancy care plan goal which identifies if the fetal and maternal status remain reassuring during the birth process  2022 by Germaine Gary RN  Outcome: Progressing  2022 by Chrys Soulier, RN  Outcome: Progressing     Problem: Postpartum  Goal: Experiences normal postpartum course  Description:  Postpartum OB-Pregnancy care plan goal which identifies if the mother is experiencing a normal postpartum course  2022 by Germaine Gary RN  Outcome: Progressing  2022 by Chrys Soulier, RN  Outcome: Progressing  Goal: Appropriate maternal -  bonding  Description:  Postpartum OB-Pregnancy care plan goal which identifies if the mother and  are bonding appropriately  2022 by Germaine Gary RN  Outcome: Progressing  2022 by Chrys Soulier, RN  Outcome: Progressing  Goal: Establishment of infant feeding pattern  Description:  Postpartum OB-Pregnancy care plan goal which identifies if the mother is establishing a feeding pattern with their   2022 by Germaine Gary RN  Outcome: Progressing  2022 by Chrys Soulier, RN  Outcome: Progressing  Goal: Incisions, wounds, or drain sites healing without S/S of infection  2022 by Germaine Gary RN  Outcome: Progressing  Flowsheets (Taken 2022)  Incisions, Wounds, or Drain Sites Healing Without Sign and Symptoms of Infection: ADMISSION and DAILY: Assess and document risk factors for pressure ulcer development  2022 by Chrys Soulier, RN  Outcome: Progressing     Problem: Pain  Goal: Verbalizes/displays adequate comfort level or baseline comfort level  2022 by Germaine Gary RN  Outcome: Progressing  Flowsheets  Taken 2022 by Germaine Gary RN  Verbalizes/displays adequate comfort level or baseline comfort level:   Encourage patient to monitor pain and request assistance   Assess pain using appropriate pain scale   Administer analgesics based on type and severity of pain and evaluate response   Implement non-pharmacological measures as appropriate and evaluate response   Consider cultural and social influences on pain and pain management   Notify Licensed Independent Practitioner if interventions unsuccessful or patient reports new pain  Taken 12/23/2022 1617 by Jo Ann Platt RN  Verbalizes/displays adequate comfort level or baseline comfort level: Encourage patient to monitor pain and request assistance  12/23/2022 1117 by Valentino Finer, RN  Outcome: Progressing     Problem: Infection - Adult  Goal: Absence of infection at discharge  12/23/2022 2203 by Zulema Cervantes RN  Outcome: Progressing  Flowsheets (Taken 12/23/2022 2014)  Absence of infection at discharge:   Assess and monitor for signs and symptoms of infection   Monitor lab/diagnostic results   Monitor all insertion sites i.e., indwelling lines, tubes and drains   Monitor endotracheal (as able) and nasal secretions for changes in amount and color   [place] appropriate cooling/warming therapies per order   Administer medications as ordered   Identify and instruct in appropriate isolation precautions for identified infection/condition   Instruct and encourage patient and family to use good hand hygiene technique  12/23/2022 1117 by Valentino Finer, RN  Outcome: Progressing  Goal: Absence of infection during hospitalization  12/23/2022 2203 by Zulema Cervantes RN  Outcome: Progressing  Flowsheets (Taken 12/23/2022 2014)  Absence of infection during hospitalization:   Assess and monitor for signs and symptoms of infection   Monitor lab/diagnostic results   Monitor all insertion sites i.e., indwelling lines, tubes and drains   Monitor endotracheal (as able) and nasal secretions for changes in amount and color   [place] appropriate cooling/warming therapies per order   Administer medications as ordered   Instruct and encourage patient and family to use good hand hygiene technique   Identify and instruct in appropriate isolation precautions for identified infection/condition  12/23/2022 1117 by Donna Rodríguez RN  Outcome: Progressing  Goal: Absence of fever/infection during anticipated neutropenic period  12/23/2022 2203 by Prince Man RN  Outcome: Progressing  Flowsheets (Taken 12/23/2022 2014)  Absence of fever/infection during anticipated neutropenic period:   Monitor white blood cell count   Administer growth factors as ordered   Implement neutropenic guidelines  12/23/2022 1117 by Donna Rodríguez RN  Outcome: Progressing     Problem: Safety - Adult  Goal: Free from fall injury  12/23/2022 2203 by Prince Man RN  Outcome: Gwen Nielsen (Taken 12/23/2022 2014)  Free From Fall Injury: Instruct family/caregiver on patient safety  12/23/2022 1117 by Donna Rodríguez RN  Outcome: Progressing     Problem: Discharge Planning  Goal: Discharge to home or other facility with appropriate resources  12/23/2022 2203 by Prince Man RN  Outcome: Progressing  12/23/2022 1117 by Donna Rodríguez RN  Outcome: Progressing     Problem: Chronic Conditions and Co-morbidities  Goal: Patient's chronic conditions and co-morbidity symptoms are monitored and maintained or improved  12/23/2022 2203 by Prince Man RN  Outcome: Progressing  12/23/2022 1117 by Donna Rodríguez RN  Outcome: Progressing

## 2022-12-24 NOTE — PROGRESS NOTES
Pt awake and resting in bed. Informed pt if she needs nurse to call number listed on white board. Pt verbalizes understanding. Infant in room with pt. Assessment completed. Pt states she is up and ambulating without difficulty and denies feeling lightheaded or dizzy. Language line used.

## 2022-12-25 VITALS
DIASTOLIC BLOOD PRESSURE: 80 MMHG | TEMPERATURE: 98.2 F | HEART RATE: 70 BPM | RESPIRATION RATE: 16 BRPM | SYSTOLIC BLOOD PRESSURE: 110 MMHG | OXYGEN SATURATION: 98 %

## 2022-12-25 PROCEDURE — 6370000000 HC RX 637 (ALT 250 FOR IP): Performed by: ADVANCED PRACTICE MIDWIFE

## 2022-12-25 RX ORDER — PSEUDOEPHEDRINE HCL 30 MG
100 TABLET ORAL 2 TIMES DAILY
Qty: 30 CAPSULE | Refills: 0 | Status: SHIPPED | OUTPATIENT
Start: 2022-12-25

## 2022-12-25 RX ORDER — IBUPROFEN 600 MG/1
600 TABLET ORAL EVERY 6 HOURS PRN
Qty: 120 TABLET | Refills: 3 | Status: SHIPPED | OUTPATIENT
Start: 2022-12-25

## 2022-12-25 RX ADMIN — IBUPROFEN 600 MG: 600 TABLET ORAL at 03:16

## 2022-12-25 RX ADMIN — DOCUSATE SODIUM 100 MG: 100 CAPSULE, LIQUID FILLED ORAL at 09:12

## 2022-12-25 NOTE — DISCHARGE SUMMARY
Obstetrical Discharge Form    Gestational Age:  37w11d    Antepartum complications: none    Date of Delivery:   2022      Type of Delivery:   vaginal, spontaneous    Delivered By:                 Marcel Raines:       Information for the patient's :  Claudia Gamez [[id number]]      Anesthesia:    None    Intrapartum complications: None    Feeding method:   breast    Postpartum complications: none    Discharge Date:  2022     Condition of discharge:  excellent    Plan:   Follow up    in 6 week(s)

## 2022-12-25 NOTE — DISCHARGE INSTRUCTIONS
Discharge Instructions    Thank you for letting us care for you and your family. The following are  discharge instructions for yourself and your baby. If you have any questions once you have arrived home please feel free to [facility] at [phone]. MOM INSTRUCTIONS    DIET    Eat a well balanced diet focusing on foods high in fiber and protein. Drink plenty of fluids (  8 to 10 glasses a day) especially water. To avoid constipation you may take a mild stool softener as recommended by your doctor or midwife. ACTIVITY    Gradually increase your activity. Resume your exercise regimen only after advised by you doctor or midwife. Avoid lifting anything heavier than your baby for 6 weeks or until otherwise advised by your doctor or midwife. Avoid driving for 2 weeks or if taking narcotics. Climb stairs one at a time. Use caution when carrying your baby up and down the stairs. NO SEXUAL ACTIVITY and nothing in your vagina( tampons or douching) for 4 to 6 weeks or until advised by your doctor or midwife. Be prepared to discuss family planning at your follow-up OB visit. You may feel tired or have a lack of energy. Nap when the baby naps to catch up on your sleep. You may continue to take prenatal vitamin to replenish nutrients post delivery as directed by your doctor or midwife. EMOTIONS    You may feel sad, teary, overwhelmed and o[place]a. Contact your OB provider if symptoms worsen or last more than 2 weeks. Contact your OB provider if you have thoughts of harming yourself or your baby. If your baby will not stop crying and you are feeling overwhelmed, place your baby in a crib on their back and contact another adult for help. NEVER SHAKE A BABY. BLEEDING    Your vaginal bleeding will decrease in amount over the next 2 to 6 weeks. You will notice that as your activity increases your flow may increase.  This is your body's way of telling you to take it easy and rest more.  If you saturate more than one maxi pad in an hour or you pass a clot larger than a lemon and resting does not help the flow slow down , call your OB doctor or midwife. If your bleeding has a foul odor call you doctor or midwife. BREAST CARE    For breastfeeding moms:  Refer to your breastfeeding booklet provided by the hospital if you have any questions. If you become engorged,feeding may be more difficult or painful for 1 to 2 days. You may find it helpful to express some milk before feeding so that the infant can latch on more easily. Continue to take your prenatal vitamins as directed by your doctor or midwife while you are breastfeeding. For any questions or concerns, contact our Lactation Consultant at [phone]. For non-breastfeeding moms:  You may apply ice packs to your breasts over your bra for twenty minutes at a time for comfort. Avoid stimulation to your breasts. When showering allow the water to strike your back not your breasts. Do not express your milk. Wear a good fitting bra. INCISIONAL CARE    Clean your incision in the shower with mild soap. After your shower pat the incision dry and keep the area open to the air. If used, steri-strips should be removed by 2 weeks. If used,staples should be removed by the OB's office in 1 week. If ordered, an abdominal binder may provide support for your incision. Have some one check your incision ever day for swelling,bleeding,drainage,foul odor,redness and that the edges are approximated. If your incision has any of the above,notify you doctor or midwife. PERINEAL CARE    Use the diane-bottle every time after you use the toilet. Cleanse your perineum from front to back. If you had stitches in your perineum,they will dissolve in 4 to 6 weeks. You may use a sitz bath and Tucks pads as directed and as needed for comfort. SWELLING    Try to keep your legs elevated when you are sitting.   When lying down keep your legs elevated. When wearing stockings or socks,make sure they are not too tight. WHEN TO CALL THE DOCTOR    If you have a temperature of 100.6 degrees or higher. If your bleeding has increased and your are soaking a maxi-pad in an hour. If your abdomen is tender to touch. If you are passing blood clots bigger than the size of a lemon. If you are experiencing extreme weakness or dizziness. If you have are having flu-like symptoms such as achy joints and muscles. If there is a foul smell or a green color to your vaginal bleeding. If you have pain that can not be relieved. If you have persistent burning with urination or frequent urination. If you have concerns about your well-being. If you have a warm,firm, red lump in your breast.  If you are unable to sleep,eat, or are having thoughts of harming yourself or the baby. If you have a red,warm, tender area in your calf. If you have swelling, bleeding, drainage,foul odor,redness or warmth in or around your incision or stitches. PAMPHLETS GIVEN TO PARENTS    [place]   Good Nutrition for Women, Infants and Children  : [employer] of [place] to help protect the health and wellness of adults and infants. [address] Located within [facility]: [address] [facility]: [place]  hearing Screening  [facility]: [place], [place] [facility] Postpartum Depression Yasmin Kim [address] Department of Health: All kids need Hepatitis B shots!   Back to sleep handout  Shaken baby handout        [address]   [facility] [address]  [phone]      [facility] [address]  [address]  [phone]

## 2022-12-25 NOTE — FLOWSHEET NOTE
ID Bands checked. Infants ID band removed and stapled to [place] Identification Footprint Sheet, the mother verified as correct, signed and witnessed by RN. Hugs tag removed. Mother of baby signed Safe Baby Crib Form verifying that she does have a safe crib for baby at home. Baby discharge Instructions given and reviewed. Mother voiced understanding. Father of baby is driving mother and baby home. Mother verbalized understanding to follow up with Pediatric Provider  in 2-3 days days. Baby harnessed into carseat at discharge by parents. Parents and baby escorted to hospital exit by nurse.

## 2022-12-25 NOTE — FLOWSHEET NOTE
Spoke with pharmacy refill on Allegra D (generic brand ) for 90 days Fexofennadine YOM184 mg , pseudoephedrine Hci 240 mg once a day.   Upon walking into the room, I found mother and baby asleep in the bed. With help of , explained to the mother that when she begins to feel sleepy, she needs to put her baby in the crib. Educated her that she could roll over onto the baby and cause suffocation.

## 2022-12-25 NOTE — PLAN OF CARE
Problem: Postpartum  Goal: Experiences normal postpartum course  Description:  Postpartum OB-Pregnancy care plan goal which identifies if the mother is experiencing a normal postpartum course  2022 08 by Roselyn Espinoza RN  Outcome: Progressing  2022 by Ramana Henderson RN  Outcome: Progressing  Goal: Appropriate maternal -  bonding  Description:  Postpartum OB-Pregnancy care plan goal which identifies if the mother and  are bonding appropriately  2022 08 by Roselyn Espinoza RN  Outcome: Progressing  2022 by Ramana Henderson RN  Outcome: Progressing  Goal: Establishment of infant feeding pattern  Description:  Postpartum OB-Pregnancy care plan goal which identifies if the mother is establishing a feeding pattern with their   2022 08 by Roselyn Espinoza RN  Outcome: Progressing  2022 by Ramana Henderson RN  Outcome: Progressing  Goal: Incisions, wounds, or drain sites healing without S/S of infection  2022 08 by Roselyn Espinoza RN  Outcome: Progressing  2022 by Ramana Henderson RN  Outcome: Progressing     Problem: Pain  Goal: Verbalizes/displays adequate comfort level or baseline comfort level  2022 08 by Roselyn Espinoza RN  Outcome: Progressing  Flowsheets (Taken 2022 by Ramana Henderson RN)  Verbalizes/displays adequate comfort level or baseline comfort level: Encourage patient to monitor pain and request assistance  2022 by Ramana Henderson RN  Outcome: Progressing  Flowsheets (Taken 2022)  Verbalizes/displays adequate comfort level or baseline comfort level: Encourage patient to monitor pain and request assistance     Problem: Infection - Adult  Goal: Absence of infection at discharge  2022 08 by Roselyn Espinoza RN  Outcome: Progressing  2022 by Ramana Henderson RN  Outcome: Progressing  Goal: Absence of infection during hospitalization  2022 08 by Roselyn Espinoza RN  Outcome: Progressing  12/24/2022 2248 by Darleen Meehan RN  Outcome: Progressing  Goal: Absence of fever/infection during anticipated neutropenic period  12/25/2022 0808 by Christin Lewis RN  Outcome: Progressing  12/24/2022 2248 by Darleen Meehan RN  Outcome: Progressing     Problem: Safety - Adult  Goal: Free from fall injury  12/25/2022 0808 by Christin Lewis RN  Outcome: Progressing  12/24/2022 2248 by Darleen Meehan RN  Outcome: Progressing     Problem: Discharge Planning  Goal: Discharge to home or other facility with appropriate resources  12/25/2022 0808 by Christin Lewis RN  Outcome: Progressing  12/24/2022 2248 by Darleen Meehan RN  Outcome: Progressing     Problem: Chronic Conditions and Co-morbidities  Goal: Patient's chronic conditions and co-morbidity symptoms are monitored and maintained or improved  12/25/2022 0808 by Christin Lewis RN  Outcome: Progressing  12/24/2022 2248 by Darleen Meehan RN  Outcome: Progressing

## 2022-12-25 NOTE — PLAN OF CARE
Problem: Postpartum  Goal: Experiences normal postpartum course  Description:  Postpartum OB-Pregnancy care plan goal which identifies if the mother is experiencing a normal postpartum course  2022 by Anila Fountain RN  Outcome: Progressing  2022 by Isabel Nuñez. Nazia Escalante RN  Outcome: Progressing  Goal: Appropriate maternal -  bonding  Description:  Postpartum OB-Pregnancy care plan goal which identifies if the mother and  are bonding appropriately  2022 by Anila Fountain RN  Outcome: Progressing  2022 by Isabel Nuñez. Nazia Escalante RN  Outcome: Progressing  Goal: Establishment of infant feeding pattern  Description:  Postpartum OB-Pregnancy care plan goal which identifies if the mother is establishing a feeding pattern with their   2022 by Anila Fountain RN  Outcome: Progressing  2022 by Isabel Nuñez. Nazia Escalante RN  Outcome: Progressing  Goal: Incisions, wounds, or drain sites healing without S/S of infection  2022 by Anila Fountain RN  Outcome: Progressing  2022 by Isabel Nuñez. Nazia Escalante RN  Outcome: Progressing     Problem: Pain  Goal: Verbalizes/displays adequate comfort level or baseline comfort level  2022 by Anila Fountain RN  Outcome: Progressing  Flowsheets (Taken 2022)  Verbalizes/displays adequate comfort level or baseline comfort level: Encourage patient to monitor pain and request assistance  2022 by Isabel Nuñez.  Nazia Escalante RN  Outcome: Progressing  Flowsheets (Taken 2022 4748)  Verbalizes/displays adequate comfort level or baseline comfort level:   Encourage patient to monitor pain and request assistance   Assess pain using appropriate pain scale   Administer analgesics based on type and severity of pain and evaluate response   Implement non-pharmacological measures as appropriate and evaluate response   Consider cultural and social influences on pain and pain management Notify Licensed Independent Practitioner if interventions unsuccessful or patient reports new pain     Problem: Infection - Adult  Goal: Absence of infection at discharge  12/24/2022 2248 by Melly Montez RN  Outcome: Progressing  12/24/2022 0901 by Kalpesh Wong. Erick Moura RN  Outcome: Progressing  Flowsheets (Taken 12/24/2022 0810)  Absence of infection at discharge: Assess and monitor for signs and symptoms of infection  Goal: Absence of infection during hospitalization  12/24/2022 2248 by Melly Montez RN  Outcome: Progressing  12/24/2022 0901 by Kalpesh Wong. Erick Moura RN  Outcome: Progressing  Flowsheets (Taken 12/24/2022 0378)  Absence of infection during hospitalization: Assess and monitor for signs and symptoms of infection  Goal: Absence of fever/infection during anticipated neutropenic period  12/24/2022 2248 by Melly Montez RN  Outcome: Progressing  12/24/2022 0901 by Kalpesh Wong. Erick Moura RN  Outcome: Progressing     Problem: Safety - Adult  Goal: Free from fall injury  12/24/2022 2248 by Melly Montez RN  Outcome: Progressing  12/24/2022 0901 by Kalpesh Wong. Erick Moura RN  Outcome: Progressing  Flowsheets (Taken 12/24/2022 0810)  Free From Fall Injury: Instruct family/caregiver on patient safety     Problem: Discharge Planning  Goal: Discharge to home or other facility with appropriate resources  12/24/2022 2248 by Melly Montez RN  Outcome: Progressing  12/24/2022 0901 by Kalpesh Wong. Erick Moura RN  Outcome: Progressing     Problem: Chronic Conditions and Co-morbidities  Goal: Patient's chronic conditions and co-morbidity symptoms are monitored and maintained or improved  12/24/2022 2248 by Melly Montez RN  Outcome: Progressing  12/24/2022 0901 by Kalpesh Wong.  Erick Moura RN  Outcome: Progressing

## 2022-12-25 NOTE — LACTATION NOTE
Re enforced (with  #[id number] assistance)that since baby is breastfeeding, she needs to be fed every 2-3 hours. Mother verbalized understanding.

## 2022-12-25 NOTE — PROGRESS NOTES
Department of Obstetrics and Gynecology  Labor and Delivery   Post Partum Progress Note      SUBJECTIVE:  Doing well with no complaints. Reports bleeding is decreasing and pain is well controlled with medication. Has voided without difficulty. Has not had BM, but + flatus. Eating and drinking well. Denies HA/visual changes/epigastric pain. Breastfeeding is going well. Reports good social support. Denies emotional concerns. OBJECTIVE:      Vitals:  /79   Pulse 67   Temp 97.8 °F (36.6 °C) (Oral)   Resp 16   LMP 2022   SpO2 99%   Breastfeeding Unknown   Lab Results   Component Value Date    WBC 6.3 2022    HGB 13.0 2022    HCT 39.3 2022    MCV 83.3 2022     (L) 2022       ABDOMEN:  Soft, non-tender. Fundus firm at u-1. BS present x 4 quadrants. LOCHIA: Normal per pt  LUNGS: CTAB  HEART: RRR  EXTREMITIES: No calf tenderness, erythema or swelling bilaterally       ASSESSMENT:      PPD # 2  S/p   Breastfeeding well  GBS NEG  RH O POS     PLAN:     Will plan for discharge on PPD2. Discharge teaching completed including counseling on warning signs (heavy vaginal bleeding, s/s of preeclampsia, fever >100.4, ACHES, s/s of PPD). Rx for colace and ibuprofen. Pt to schedule f/u pp visit at 6 weeks in the office.        DELON Zapien CNM

## 2023-04-27 NOTE — L&D DELIVERY NOTE
GI at bedside     Aylin Milian RN  04/27/23 0560 Mother's Information      Labor Events      Cervical Ripening:   Now               Leonel Rascon, Baby Pending [facility]iver [[id number]]      Labor Events      Cervical Ripening Date/Time:       Rupture Date/Time: 22 09:44:00   Rupture Type: AROM  Fluid Color: Clear, Bloody Show  Fluid Odor: None  Labor Complications: None       Anesthesia    Method: Other       Start Pushing      Labor onset date/time:   Now     Dilation complete date/time:   Now     Start pushing date/time:    Decision date/time (emergent ):           Delivery ()      Delivery Date/Time:  22 12:37:00   Delivery Type: Vaginal, Spontaneous    Details:             Presentation    Presentation: Vertex  Position: Right  _: Occiput  _: Anterior       Shoulder Dystocia    Shoulder Dystocia Present?: No  Add Second Maneuver  Add Third Maneuver  Add Fourth Maneuver  Add Fifth Maneuver  Add Sixth Maneuver  Add Seventh Maneuver  Add Eighth Maneuver  Add Ninth Maneuver       Assisted Delivery Details    Forceps Attempted?: No  Vacuum Extractor Attempted?: No       Document Additional Attempt         Document Additional Attempt                 Cord    Vessels: 3 Vessels  Complications: None       Placenta    Removal: Spontaneous  Appearance: Intact       Lacerations    Episiotomy: None  Perineal Lacerations: None  Other Lacerations: no non-perineal laceration       Vaginal Counts        Sponges Needles Instruments   Initial Counts      Final Counts      If the count is incorrect due to Intentionally Retained Foreign Object (IRFO) add the IRFO LDA in Lines/Drains.   Add LDA: Link to [facility]       Blood Loss  Mother: Bossman Rich #[id number]     Start of Mother's Information      Delivery Blood Loss  22 0103 - 22 1303      None                 End of Mother's Information  Mother: Bossman Rich #[id number]                Delivery Providers    Delivering clinician:      Provider Role Notified provider about indwelling enriquez catheter discussed removal or continued need.    Did provider choose to remove indwelling enriquez catheter? Yes    Provider's enriquez indication for keeping indwelling enriquez catheter: Retention.    Is there an order for indwelling enriquez catheter? Yes    *If there is a plan to keep enriquez catheter in place at discharge daily notification with provider is not necessary.    Obstetrician     Primary Nurse     Primary [place] Nurse     NICU Nurse     Neonatologist     Anesthesiologist     Nurse Anesthetist     Nurse Practitioner     Midwife     Nursery Nurse              [place] Assessment    Living Status: Living  Delivery Location Comment: LD05     Apgar Scoring Key:    0 1 2    Skin Color: Blue or pale Acrocyanotic Completely pink    Heart Rate: Absent <100 bpm >100 bpm    Reflex Irritability: No response Grimace Cry or active withdrawal    Muscle Tone: Limp Some flexion Active motion    Respiratory Effort: Absent Weak cry; hypoventilation Good, crying                      Skin Color:   Heart Rate:   Reflex Irritability:   Muscle Tone:   Respiratory Effort: Total:            1 Minute:    0    2    2    2    2    8        Apgar 1 total from OB History    5 Minute:    1    2    2    2    2    9        Apgar 5 total from OB History    10 Minute:              15 Minute:              20 Minute:                                 Resuscitation    Method: Bulb Suction, Stimulation              Measurements               Title      Skin to Skin Initiation Date/Time:       Skin to Skin End Date/Time:                    Department of Obstetrics and Gynecology  Spontaneous Vaginal Delivery Note         Pre-operative Diagnosis:  Term pregnancy    Post-operative Diagnosis:  Same + live female    Procedure:  Spontaneous vaginal delivery    Surgeon:  Tawni Severance, APRN - CNM    Information for the patient's :  Maria A Ji Pending Shameka Dillon [[id number]]        Anesthesia:  epidural anesthesia    Estimated blood loss:  50mL    Specimen:  Placenta not sent to pathology     Cord blood sent Yes    Complications:  none    Condition:  infant stable to general nursery    Details of Procedure:    The patient is a 32 y.o. female at 37w11d   OB History          4    Para   3    Term   3            AB        Living   3         SAB        IAB        Ectopic        Molar Multiple        Live Births   3             who was admitted for induction of labor. She received the following interventions: ARBOW. Called to room for delivery. Patient instructed to push. Head delivered with ease followed by anterior shoulder and body. Infant placed on mother's chest. Infant pink and alert with lusty cry. Cord clamped and cut after 1 minute by FOB. Cord blood and segment collected. Placenta delivered spontaneously intact. Perineum inspected and found to have none lacerations. Repaired in standard fashion with hemostasis. EBL 50 mL. Mom and baby stable and resting.      Dr. Lucien Palma notified of [address] [place] [address]  12/23/2022  1:04 PM